# Patient Record
Sex: FEMALE | Race: WHITE
[De-identification: names, ages, dates, MRNs, and addresses within clinical notes are randomized per-mention and may not be internally consistent; named-entity substitution may affect disease eponyms.]

---

## 2017-02-20 ENCOUNTER — HOSPITAL ENCOUNTER (OUTPATIENT)
Dept: HOSPITAL 19 - MC.RAD | Age: 57
End: 2017-02-20
Attending: OBSTETRICS & GYNECOLOGY
Payer: COMMERCIAL

## 2017-02-20 DIAGNOSIS — Z12.31: Primary | ICD-10-CM

## 2019-02-14 ENCOUNTER — HOSPITAL ENCOUNTER (OUTPATIENT)
Dept: HOSPITAL 19 - COL.RAD | Age: 59
End: 2019-02-14
Attending: NURSE PRACTITIONER
Payer: COMMERCIAL

## 2019-02-14 DIAGNOSIS — R06.02: ICD-10-CM

## 2019-02-14 DIAGNOSIS — R07.89: ICD-10-CM

## 2019-02-14 DIAGNOSIS — I10: Primary | ICD-10-CM

## 2019-09-23 ENCOUNTER — HOSPITAL ENCOUNTER (OUTPATIENT)
Dept: HOSPITAL 19 - COL.RAD | Age: 59
End: 2019-09-23
Payer: COMMERCIAL

## 2019-09-23 DIAGNOSIS — R91.8: ICD-10-CM

## 2019-09-23 DIAGNOSIS — R06.02: ICD-10-CM

## 2019-09-23 DIAGNOSIS — R07.89: Primary | ICD-10-CM

## 2020-09-29 ENCOUNTER — HOSPITAL ENCOUNTER (OUTPATIENT)
Dept: HOSPITAL 6 - MAMMO | Age: 60
End: 2020-09-29
Attending: FAMILY MEDICINE
Payer: COMMERCIAL

## 2020-09-29 DIAGNOSIS — Z12.31: Primary | ICD-10-CM

## 2020-11-04 ENCOUNTER — HOSPITAL ENCOUNTER (OUTPATIENT)
Dept: HOSPITAL 19 - MC.RAD | Age: 60
End: 2020-11-04
Attending: SURGERY
Payer: COMMERCIAL

## 2020-11-04 DIAGNOSIS — N64.52: Primary | ICD-10-CM

## 2020-12-11 ENCOUNTER — HOSPITAL ENCOUNTER (OUTPATIENT)
Dept: HOSPITAL 19 - SDCO | Age: 60
Discharge: HOME | End: 2020-12-11
Attending: SURGERY
Payer: COMMERCIAL

## 2020-12-11 VITALS — DIASTOLIC BLOOD PRESSURE: 67 MMHG | SYSTOLIC BLOOD PRESSURE: 132 MMHG | TEMPERATURE: 97.6 F | HEART RATE: 68 BPM

## 2020-12-11 VITALS — SYSTOLIC BLOOD PRESSURE: 115 MMHG | DIASTOLIC BLOOD PRESSURE: 86 MMHG | HEART RATE: 71 BPM | TEMPERATURE: 97.6 F

## 2020-12-11 VITALS — DIASTOLIC BLOOD PRESSURE: 61 MMHG | HEART RATE: 74 BPM | SYSTOLIC BLOOD PRESSURE: 126 MMHG

## 2020-12-11 VITALS — DIASTOLIC BLOOD PRESSURE: 66 MMHG | HEART RATE: 71 BPM | SYSTOLIC BLOOD PRESSURE: 138 MMHG

## 2020-12-11 VITALS — SYSTOLIC BLOOD PRESSURE: 145 MMHG | HEART RATE: 78 BPM | TEMPERATURE: 98.9 F | DIASTOLIC BLOOD PRESSURE: 57 MMHG

## 2020-12-11 VITALS — SYSTOLIC BLOOD PRESSURE: 138 MMHG | HEART RATE: 70 BPM | DIASTOLIC BLOOD PRESSURE: 56 MMHG

## 2020-12-11 VITALS — WEIGHT: 172.4 LBS | HEIGHT: 64 IN | BODY MASS INDEX: 29.43 KG/M2

## 2020-12-11 DIAGNOSIS — K21.9: ICD-10-CM

## 2020-12-11 DIAGNOSIS — Z79.4: ICD-10-CM

## 2020-12-11 DIAGNOSIS — D24.2: Primary | ICD-10-CM

## 2020-12-11 DIAGNOSIS — N64.52: ICD-10-CM

## 2020-12-11 DIAGNOSIS — E78.00: ICD-10-CM

## 2020-12-11 DIAGNOSIS — E11.9: ICD-10-CM

## 2020-12-11 DIAGNOSIS — I10: ICD-10-CM

## 2020-12-11 DIAGNOSIS — Z20.828: ICD-10-CM

## 2020-12-11 NOTE — NUR
The patient appeared to tolerate the water well and agrees to try some hot tea
and a blueberry muffin. Vital signs appear stable. Will continue to monitor
the patient.

## 2020-12-11 NOTE — NUR
The patient appears to be resting comfortably on the cart at this time. Vital
signs appear stable. Will continue to monitor the patient.

## 2020-12-11 NOTE — NUR
The patient was escorted out via wheelchair to a private vehicle by OTILIO Karimi.
The patient's belongings and discharge paperwork sent her. The patient's
friend is present to drive her home.

## 2020-12-11 NOTE — NUR
Discharge instructions were reviewed with the patient at this time. She
verbalzed understanding and has no questions for the nurse at this time. The
patient's IV to her right forearm was removed and a pressure dressing was
applied to the site.

## 2020-12-11 NOTE — NUR
The patient arrived back to [facility] from the recovery room at this time. The
patient appears alert and orietned and denies any pain or nausea at this time.
The patient's dressing to her left breast appears clean, dry and intact. Post
operative vital signs were started at this time. The patient agrees to try
some ice water at this time. Call light is within reach. Will continue to
monitor the patient.

## 2020-12-11 NOTE — NUR
The patient ambulated to the bathroom with the stand by assistance of one
nurse and appeared to tolerate the activity well. The nurse instructed the
patient that if she was able to void that she could get dressed and notify the
staff when she is ready to review her discharge paperwork.

## 2020-12-11 NOTE — NUR
The patient appears to be tolerating the food and drink well. The patient
continues to report minimal pain in her left breast at this time. Will
continue to monitor the patient.

## 2024-12-09 ENCOUNTER — HOSPITAL ENCOUNTER (OUTPATIENT)
Dept: HOSPITAL 6 - MAMMO | Age: 64
End: 2024-12-09
Attending: FAMILY MEDICINE
Payer: COMMERCIAL

## 2024-12-09 DIAGNOSIS — Z12.31: Primary | ICD-10-CM
